# Patient Record
(demographics unavailable — no encounter records)

---

## 2025-03-26 NOTE — HISTORY OF PRESENT ILLNESS
[de-identified] :  Initial Visit: left shoulder pain and weakness Reason: took a fall in January 2025  Duration: 2 months  Prior studies: xray order  Symptoms: throbbing and stabbing  Aggravating Fx: raising his arm  Alleviating Fx: resting  Pain: 4/10 Pain med: ibuprofen  Medical Hx: no Surgery Hx: no Current MEd: Allergies: no

## 2025-03-26 NOTE — PHYSICAL EXAM
[de-identified] : Left Shoulder: Constitutional: The patient is healthy-appearing and in no apparent distress.   Cardiovascular System:  The capillary refill is less than 2 seconds.   Skin:  There are no skin abnormalities.  C-Spine/Neck:  Active Range of Motion: Flexion				50 Extension			60 Lateral rotation			80    Left Shoulder:  Inspection:  There is no atrophy, erythema, warmth, swelling. There is no scapular winging. There is no AC prominence.   Bony Palpation:  There is no tenderness of the clavicle. There is no tenderness of the acromioclavicular joint. There is no tenderness of the greater tuberosity.  There is no tenderness of the bicipital groove.   Soft Tissue Palpation:  There is no tenderness of the trapezius. There is no tenderness of the rhomboid. There is no tenderness of the subacromial bursa.   Active Range of Motion:  Forward flexion- 				180  Abduction-					150 External rotation at 0 degrees abduction-	80  Internal rotation at 0 degrees abduction-	80  Passive Range of Motion:  Forward flexion- 			180  Abduction-				150 External rotation at 0 deg abduction-	80  Internal rotation at 0 deg abduction-	80  Strength: Supraspinatus / Abduction                  4+/5 External rotation                                 4+/5 Internal rotation                                   5/5  Special Tests:  Hawkin's  				Positive  Neer's  				Positive  Speed's  				Negative AC cross-over 			            Negative [place]'s  				Negative  Neurological System:   There is normal sensation to light touch C5-T1.   Stability:  There is no general laxity.   Psychiatric:  The patient demonstrates a normal mood and affect and is active and alert [de-identified] : X-ray left shoulder:  There is no arthritis or fracture X-ray cervical spine:  There is no arthritis or fracture

## 2025-03-26 NOTE — HISTORY OF PRESENT ILLNESS
[de-identified] :  Initial Visit: left shoulder pain and weakness Reason: took a fall in January 2025  Duration: 2 months  Prior studies: xray order  Symptoms: throbbing and stabbing  Aggravating Fx: raising his arm  Alleviating Fx: resting  Pain: 4/10 Pain med: ibuprofen  Medical Hx: no Surgery Hx: no Current MEd: Allergies: no

## 2025-03-26 NOTE — PHYSICAL EXAM
[de-identified] : Left Shoulder: Constitutional: The patient is healthy-appearing and in no apparent distress.   Cardiovascular System:  The capillary refill is less than 2 seconds.   Skin:  There are no skin abnormalities.  C-Spine/Neck:  Active Range of Motion: Flexion				50 Extension			60 Lateral rotation			80    Left Shoulder:  Inspection:  There is no atrophy, erythema, warmth, swelling. There is no scapular winging. There is no AC prominence.   Bony Palpation:  There is no tenderness of the clavicle. There is no tenderness of the acromioclavicular joint. There is no tenderness of the greater tuberosity.  There is no tenderness of the bicipital groove.   Soft Tissue Palpation:  There is no tenderness of the trapezius. There is no tenderness of the rhomboid. There is no tenderness of the subacromial bursa.   Active Range of Motion:  Forward flexion- 				180  Abduction-					150 External rotation at 0 degrees abduction-	80  Internal rotation at 0 degrees abduction-	80  Passive Range of Motion:  Forward flexion- 			180  Abduction-				150 External rotation at 0 deg abduction-	80  Internal rotation at 0 deg abduction-	80  Strength: Supraspinatus / Abduction                  4+/5 External rotation                                 4+/5 Internal rotation                                   5/5  Special Tests:  Hawkin's  				Positive  Neer's  				Positive  Speed's  				Negative AC cross-over 			            Negative [place]'s  				Negative  Neurological System:   There is normal sensation to light touch C5-T1.   Stability:  There is no general laxity.   Psychiatric:  The patient demonstrates a normal mood and affect and is active and alert [de-identified] : X-ray left shoulder:  There is no arthritis or fracture X-ray cervical spine:  There is no arthritis or fracture